# Patient Record
Sex: MALE | Race: WHITE | NOT HISPANIC OR LATINO | Employment: FULL TIME | URBAN - METROPOLITAN AREA
[De-identification: names, ages, dates, MRNs, and addresses within clinical notes are randomized per-mention and may not be internally consistent; named-entity substitution may affect disease eponyms.]

---

## 2019-11-10 ENCOUNTER — APPOINTMENT (EMERGENCY)
Dept: CT IMAGING | Facility: HOSPITAL | Age: 34
End: 2019-11-10
Payer: COMMERCIAL

## 2019-11-10 ENCOUNTER — HOSPITAL ENCOUNTER (EMERGENCY)
Facility: HOSPITAL | Age: 34
Discharge: HOME | End: 2019-11-10
Attending: EMERGENCY MEDICINE | Admitting: EMERGENCY MEDICINE
Payer: COMMERCIAL

## 2019-11-10 VITALS
RESPIRATION RATE: 18 BRPM | OXYGEN SATURATION: 98 % | DIASTOLIC BLOOD PRESSURE: 62 MMHG | HEART RATE: 66 BPM | SYSTOLIC BLOOD PRESSURE: 118 MMHG | TEMPERATURE: 97.8 F
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
## 2019-11-10 DIAGNOSIS — S20.219A CHEST WALL CONTUSION: Primary | ICD-10-CM

## 2019-11-10 PROCEDURE — 99284 EMERGENCY DEPT VISIT MOD MDM: CPT | Performed by: EMERGENCY MEDICINE

## 2019-11-10 PROCEDURE — 99283 EMERGENCY DEPT VISIT LOW MDM: CPT

## 2019-11-10 PROCEDURE — 72125 CT NECK SPINE W/O DYE: CPT

## 2019-11-10 PROCEDURE — 71250 CT THORAX DX C-: CPT

## 2019-11-10 RX ORDER — ACETAMINOPHEN 325 MG/1
975 TABLET ORAL ONCE
Status: COMPLETED | OUTPATIENT
Start: 2019-11-10 | End: 2019-11-10

## 2019-11-10 RX ADMIN — ACETAMINOPHEN 975 MG: 325 TABLET, FILM COATED ORAL at 19:48

## 2019-11-10 NOTE — ED NOTES
Pt fell yesterday about 3:30pm   Pt states normal urine and food intake  No marks on skin  Pt was climbing up ladder for hunting stand when fall occurred  Pt drove over today to ED  Pt has not taken any OTC pain medications prior to arrival today       Meryle Mage, RN  11/10/19 [address] [place]  11/10/19 0094

## 2019-11-10 NOTE — ED PROVIDER NOTES
History  Chief Complaint   Patient presents with    Fall     from about 14 feet ladder, co of chest and back pain, denies LOC or thinners, + head injury      Pt comes to ED c/o moderate aching R upper back/thoracic pain and neck pain since earlier today  He reports falling out of a ladder yesterday approx 14 feet, struck back, did not hit head or lose consciousness, denies vomiting numbness weakness and dizziness  He is not anticoagulated  Today he noted pain in back and neck and therefore comes to ED for further evaluation  No modifiers  No other sxs or concerns  No dyspnea  No abd pain  No extremity pain or numbness or weakness  None       History reviewed  No pertinent past medical history  History reviewed  No pertinent surgical history  History reviewed  No pertinent family history  I have reviewed and agree with the history as documented  Social History     Tobacco Use    Smoking status: Never Smoker    Smokeless tobacco: Never Used   Substance Use Topics    Alcohol use: Not Currently    Drug use: Not Currently        Review of Systems   Constitutional: Negative for chills and fever  Respiratory: Negative for chest tightness and shortness of breath  Musculoskeletal: Positive for back pain and neck pain  All other systems reviewed and are negative  Physical Exam  Physical Exam   Constitutional: He is oriented to person, place, and time  He appears well-developed and well-nourished  No distress  HENT:   Head: Normocephalic and atraumatic  Eyes: Pupils are equal, round, and reactive to light  Conjunctivae and EOM are normal    Neck: Normal range of motion  Neck supple  No JVD present  No tracheal deviation present  c spine tender to palpation in midline  Cardiovascular: Normal rate, regular rhythm, normal heart sounds and intact distal pulses  Exam reveals no gallop and no friction rub  No murmur heard    Pulmonary/Chest: Effort normal and breath sounds normal  No stridor  No respiratory distress  He has no wheezes  He has no rales  He exhibits no tenderness  Abdominal: Soft  He exhibits no distension  There is no tenderness  Musculoskeletal: Normal range of motion  He exhibits no edema, tenderness or deformity  Neurological: He is alert and oriented to person, place, and time  No cranial nerve deficit or sensory deficit  He exhibits normal muscle tone  Coordination normal    Skin: Skin is warm and dry  Capillary refill takes less than 2 seconds  He is not diaphoretic  Nursing note and vitals reviewed        Vital Signs  ED Triage Vitals   Temperature Pulse Respirations Blood Pressure SpO2   11/10/19 1832 11/10/19 1832 11/10/19 1832 11/10/19 1832 11/10/19 1832   (!) 97 4 °F (36 3 °C) 66 16 134/73 98 %      Temp Source Heart Rate Source Patient Position - Orthostatic VS BP Location FiO2 (%)   11/10/19 1832 11/10/19 1832 11/10/19 1832 11/10/19 1832 --   Oral Monitor Sitting Left arm       Pain Score       11/10/19 1948       5           Vitals:    11/10/19 1832 11/10/19 1925 11/10/19 2045   BP: 134/73 120/68 118/62   Pulse: 66 64 66   Patient Position - Orthostatic VS: Sitting  Lying         Visual Acuity      ED Medications  Medications   acetaminophen (TYLENOL) tablet 975 mg (975 mg Oral Given 11/10/19 1948)       Diagnostic Studies  Results Reviewed     None                 CT spine cervical without contrast   Final Result by Joelle Hatfield MD (11/10 1936)      No acute compression collapse of the vertebra                   Workstation performed: [id number]         CT chest without contrast   Final Result by Joelle Hatfield MD (11/10 2101)      No pleural effusion   No pneumothorax   No acute consolidation   No acute compression collapse of the vertebra               Workstation performed: [id number]                    Procedures  Procedures       ED Course                               MDM    Disposition  Final diagnoses:   Chest wall contusion   Fall, initial encounter     Time reflects when diagnosis was documented in both MDM as applicable and the Disposition within this note     Time User Action Codes Description Comment    11/10/2019  9:07 PM Lizandro Spence Add [S20 219A] Chest wall contusion     11/10/2019  9:08 PM Lizandro Spence Add [W19  Emilyned Valentino, initial encounter       ED Disposition     ED Disposition Condition Date/Time Comment    Discharge Stable Sun Nov 10, 2019  9:07  [place] discharge to home/self care  Follow-up Information    None         There are no discharge medications for this patient  No discharge procedures on file      ED Provider  Electronically Signed by           Rissa Chua MD  11/10/19 4936
